# Patient Record
Sex: FEMALE | URBAN - METROPOLITAN AREA
[De-identification: names, ages, dates, MRNs, and addresses within clinical notes are randomized per-mention and may not be internally consistent; named-entity substitution may affect disease eponyms.]

---

## 2019-04-02 ENCOUNTER — APPOINTMENT (RX ONLY)
Dept: URBAN - METROPOLITAN AREA CLINIC 2 | Facility: CLINIC | Age: 49
Setting detail: DERMATOLOGY
End: 2019-04-02

## 2019-04-02 DIAGNOSIS — L30.9 DERMATITIS, UNSPECIFIED: ICD-10-CM

## 2019-04-02 DIAGNOSIS — L29.8 OTHER PRURITUS: ICD-10-CM

## 2019-04-02 DIAGNOSIS — L29.89 OTHER PRURITUS: ICD-10-CM

## 2019-04-02 DIAGNOSIS — N95.1 MENOPAUSAL AND FEMALE CLIMACTERIC STATES: ICD-10-CM

## 2019-04-02 PROBLEM — E13.9 OTHER SPECIFIED DIABETES MELLITUS WITHOUT COMPLICATIONS: Status: ACTIVE | Noted: 2019-04-02

## 2019-04-02 PROBLEM — E03.9 HYPOTHYROIDISM, UNSPECIFIED: Status: ACTIVE | Noted: 2019-04-02

## 2019-04-02 PROCEDURE — ? DEFER

## 2019-04-02 PROCEDURE — 99243 OFF/OP CNSLTJ NEW/EST LOW 30: CPT

## 2019-04-02 PROCEDURE — ? TREATMENT REGIMEN

## 2019-04-02 PROCEDURE — ? COUNSELING

## 2019-04-02 PROCEDURE — ? PRESCRIPTION

## 2019-04-02 RX ORDER — SALICYLIC ACID 3 G/100G
LOTION TOPICAL
Qty: 30 | Refills: 0 | Status: ERX | COMMUNITY
Start: 2019-04-02

## 2019-04-02 RX ADMIN — SALICYLIC ACID: 3 LOTION TOPICAL at 22:11

## 2019-04-02 ASSESSMENT — LOCATION SIMPLE DESCRIPTION DERM
LOCATION SIMPLE: LEFT POSTERIOR UPPER ARM
LOCATION SIMPLE: RIGHT FOREARM
LOCATION SIMPLE: LEFT UPPER BACK
LOCATION SIMPLE: LEFT POSTERIOR UPPER ARM
LOCATION SIMPLE: LEFT UPPER BACK
LOCATION SIMPLE: RIGHT FOREARM

## 2019-04-02 ASSESSMENT — LOCATION DETAILED DESCRIPTION DERM
LOCATION DETAILED: LEFT DISTAL POSTERIOR UPPER ARM
LOCATION DETAILED: RIGHT PROXIMAL RADIAL DORSAL FOREARM
LOCATION DETAILED: RIGHT PROXIMAL DORSAL FOREARM
LOCATION DETAILED: LEFT MID-UPPER BACK
LOCATION DETAILED: LEFT DISTAL POSTERIOR UPPER ARM
LOCATION DETAILED: LEFT MID-UPPER BACK

## 2019-04-02 ASSESSMENT — LOCATION ZONE DERM
LOCATION ZONE: ARM
LOCATION ZONE: TRUNK
LOCATION ZONE: ARM
LOCATION ZONE: TRUNK

## 2019-04-02 NOTE — PROCEDURE: DEFER
Introduction Text (Please End With A Colon): The following procedure was deferred:
Detail Level: Detailed
Procedure To Be Performed At Next Visit: Biopsy by punch method
Instructions (Optional): Will determine Rule outs at next visit \\[id number]